# Patient Record
Sex: MALE | Race: WHITE | Employment: FULL TIME | URBAN - METROPOLITAN AREA
[De-identification: names, ages, dates, MRNs, and addresses within clinical notes are randomized per-mention and may not be internally consistent; named-entity substitution may affect disease eponyms.]

---

## 2022-09-21 ENCOUNTER — OFFICE VISIT (OUTPATIENT)
Dept: ORTHOPEDIC SURGERY | Age: 47
End: 2022-09-21
Payer: COMMERCIAL

## 2022-09-21 VITALS — HEIGHT: 71 IN | BODY MASS INDEX: 31.08 KG/M2 | WEIGHT: 222 LBS

## 2022-09-21 DIAGNOSIS — M17.0 OSTEOARTHRITIS OF PATELLOFEMORAL JOINTS OF BOTH KNEES: ICD-10-CM

## 2022-09-21 DIAGNOSIS — M25.562 PAIN IN BOTH KNEES, UNSPECIFIED CHRONICITY: Primary | ICD-10-CM

## 2022-09-21 DIAGNOSIS — M25.561 PAIN IN BOTH KNEES, UNSPECIFIED CHRONICITY: Primary | ICD-10-CM

## 2022-09-21 PROCEDURE — 99214 OFFICE O/P EST MOD 30 MIN: CPT | Performed by: ORTHOPAEDIC SURGERY

## 2022-09-21 NOTE — PROGRESS NOTES
Date:  2022    Name:  Amrit Chapman  Address:  [address]  [facility] [address]    :  1975      Age:   41 y.o.    SSN:  [id number]      Medical Record Number:  [id number]    Reason for Visit:    Chief Complaint    Knee Pain (Bilateral knees )      DOS:2022     HPI: Amrit Chapman is a 41 y.o. male here today for evaluation bilateral knee pain. Pain has been present for several years on the right and approximately 1 week on the left. Pain is worse with activity specifically climbing stairs, squatting, and rising from a seated position. He does admit and history of twisting injury approximately 1 year ago on the right but is unsure if this correlates with his pain. Denies numbness, tingling, decrease sensation or diminished motor function in either extremity. No locking, catching      Pain Assessment  Location of Pain: Knee  Location Modifiers: Right, Left  Severity of Pain: 2  Quality of Pain: Sharp  Duration of Pain: Persistent  Frequency of Pain: Intermittent  Aggravating Factors:  (twisting)  Limiting Behavior: Some  Relieving Factors: Rest  Result of Injury: No  Work-Related Injury: No  Are there other pain locations you wish to document?: No  ROS: All systems reviewed on patient intake form. Pertinent items are noted in HPI. History reviewed. No pertinent past medical history. History reviewed. No pertinent surgical history. History reviewed. No pertinent family history. Social History     Socioeconomic History    Marital status: Single     Spouse name: None    Number of children: None    Years of education: None    Highest education level: None   Tobacco Use    Smoking status: Never   Substance and Sexual Activity    Alcohol use: Yes     Comment: socially    Drug use: No       Current Outpatient Medications   Medication Sig Dispense Refill    ciprofloxacin (CIPRO) 500 MG tablet Take 500 mg by mouth 2 times daily.       cetirizine (ZYRTEC ALLERGY) 10 MG tablet Take 1 tablet by mouth daily. 5 tablet 0     No current facility-administered medications for this visit. No Known Allergies    Vital signs:  Ht 5' 11\" (1.803 m)   Wt 222 lb (100.7 kg)   BMI 30.96 kg/m²        Neuro: Alert & oriented x 3,  normal,  no focal deficits noted. Normal affect. Eyes: sclera clear  Ears: Normal external ear  Mouth:  No perioral lesions  Pulm: Respirations unlabored and regular  Pulse: Regular rate    Skin: Warm, well perfused        Right knee exam    Gait: No use of assistive devices. No antalgic gait. Alignment: normal alignment. Inspection/skin: Skin is intact without erythema or ecchymosis. No gross deformity. Palpation: Patellofemoral crepitance. no joint line tenderness present. Range of Motion: There is full range of motion. Mild hyperextension    Strength: Normal quadriceps development. Effusion: No effusion or swelling present. Ligamentous stability: No cruciate or collateral ligament instability. +1 patellar glide    Neurologic and vascular: Skin is warm and well-perfused. Sensation is intact to light-touch. Special tests: Negative Rodolfo sign. Positive patellar grind      Left knee exam    Gait: No use of assistive devices. No antalgic gait. Alignment: normal alignment. Inspection/skin: Skin is intact without erythema or ecchymosis. No gross deformity. Palpation: Patellofemoral crepitance. no joint line tenderness present. Range of Motion: There is full range of motion. Mild hyperextension    Strength: Normal quadriceps development. Effusion: No effusion or swelling present. Ligamentous stability: No cruciate or collateral ligament instability. +1 patellar glide    Neurologic and vascular: Skin is warm and well-perfused. Sensation is intact to light-touch. Special tests: Negative Rodolfo sign.   Positive patellar grind          Diagnostics:  Radiology:     Radiographs were obtained and reviewed in the office; 4 views: bilateral PA, bilateral Tyrell Proffer, bilateral Merchants AND bilateral lateral    Impression: Mild narrowing patellofemoral joint and medial right tibiofemoral joint. No fractures or dislocations. Assessment: 30-year-old male bilateral patellofemoral osteoarthritis    Plan: We discussed the patient's diagnosis of bilateral patellofemoral osteoarthritis. He also demonstrated mild hyperlaxity on examination globally. He is a candidate for formal physical therapy and over-the-counter anti-inflammatories as needed. We will see him back in 2 to 4 weeks for reevaluation and monitoring of progress. Ana Sudeepshantal is in agreement with this plan. All questions were answered to patient's satisfaction and was encouraged to call with any further questions. Orders Placed This Encounter   Procedures    XR KNEE RIGHT (MIN 4 VIEWS)     Standing Status:   Future     Number of Occurrences:   1     Standing Expiration Date:   9/21/2023     Order Specific Question:   Reason for exam:     Answer:   pain    XR KNEE LEFT (MIN 4 VIEWS)     Standing Status:   Future     Number of Occurrences:   1     Standing Expiration Date:   9/21/2023     Order Specific Question:   Reason for exam:     Answer:   pain       Regulo Frias D.O. Orthopedic Surgeon, [facility] Fellow    Total time spent for evaluation, education, and development of treatment plan: 45 minutes    I attest that I met personally with the patient, performed the described exam, reviewed the radiographic studies and medical records associated with this patient and supervised the services that are described above.      Argelia Payne MD

## 2022-10-03 ENCOUNTER — HOSPITAL ENCOUNTER (OUTPATIENT)
Dept: PHYSICAL THERAPY | Age: 47
Setting detail: THERAPIES SERIES
Discharge: HOME | End: 2022-10-03
Payer: COMMERCIAL

## 2022-10-03 PROCEDURE — 97110 THERAPEUTIC EXERCISES: CPT

## 2022-10-03 PROCEDURE — 97161 PT EVAL LOW COMPLEX 20 MIN: CPT

## 2022-10-03 NOTE — PLAN OF CARE
The [address] [place] [address] and Babetta Prader Certification    Dear  Dr. Sheela Figueroa,    We had the pleasure of evaluating the following patient for physical therapy services at [address] [place] [address]. A summary of our findings can be found in the initial assessment below. This includes our plan of care. If you have any questions or concerns regarding these findings, please do not hesitate to contact me at the office phone number checked above. Thank you for the referral.       Physician Signature:_______________________________Date:__________________  By signing above (or electronic signature), therapists plan is approved by physician      Patient: Jace Schmitz   : 1975   MRN: [id number]  Referring Physician:        Evaluation Date: 10/3/2022      Medical Diagnosis Information:  Diagnosis: M25.561 R knee pain, M25.562 L knee pain   Treatment Diagnosis: M25.561 R knee pain, M25.562 L knee pain                                         Insurance information:  [employer]     Precautions/ Contra-indications: none      C-SSRS Triggered by Intake questionnaire (Past 2 wk assessment):   [x] No, Questionnaire did not trigger screening.   [] Yes, Patient intake triggered further evaluation      [] C-SSRS Screening completed  [] PCP notified via Plan of Care  [] Emergency services notified     Latex Allergy:  [x]NO      []YES  Preferred Language for Healthcare:   [x]English       []other:    SUBJECTIVE: Patient stated complaint: Pt has remote hx of R tibial plateau fx. Pt recently had a twist and pop experience in R knee approx 1 year ago. He notes since then he has felt \"looser\" in his joint and pain has worsened a bit over time, notably with ? activity. Pt also had a twisting/popping incident on LLE, mid Sept. Pt was fairly worried about state of knees at that time and made appt with Dr. Sheela Figueroa.  Pt saw Dr. Dena Ordonez who recommended PT. Pt dx with PF OA. Relevant Medical History:(-) latex allergy, (-) adhesive sensitivity, (-) pacemaker, (-) metal/electrical implants, (-) heart history, (-) CA, (-) stroke, (-) seizure, (-) diabetes, (-) asthma or SOB    Functional Disability Index: FOTO: 59/100    Pain Scale: 2-5/10  Easing factors: tylenol, aleve prn  Provocative factors: steps, prolonged positioning, kneeling, walking, standing     Type: []Constant   []Intermittent  []Radiating []Localized []other:     Numbness/Tingling: R toes (possibly related to lumbar spine)    Occupation/School:  (50% on feet, 50% sitting; lifting heavy a couple times per day)    Living Status/Prior Level of Function: Independent with ADLs and IADLs    OBJECTIVE:     ROM LEFT RIGHT   HIP Flex WNL WNL   HIP Abd     HIP Ext     HIP IR WNL WNL   HIP ER WNL WNL   Knee ext 0 °  0 °    Knee Flex 140 °  140  °    Ankle PF     Ankle DF     Ankle In     Ankle Ev     Strength  LEFT RIGHT   HIP Flexors 5/5 5/5   HIP Abductors     HIP Ext     Hip ER     Knee EXT (quad) 4+/5 4+/5   Knee Flex (HS) 5/5 5/5   Ankle DF 5/5 5/5   Ankle PF     Ankle Inv     Ankle EV          Circumference  Mid patella     No swelling   No swelling     Reflexes/Sensation:    [x]Dermatomes/Myotomes intact    [x]Reflexes equal and normal bilaterally   []Other:    Joint mobility:    []Normal    []Hypo   [x]Hyper: bilat patella    Palpation: TTP: superior to patella R    Functional Mobility/Transfers: not limited    Posture: slight ? toeing out on RLE    Bandages/Dressings/Incisions: n/a    Gait: (include devices/WB status) antalgic gait RLE    Orthopedic Special Tests: NT                       [x] Patient history, allergies, meds reviewed. Medical chart reviewed. See intake form. Review Of Systems (ROS):  [x]Performed Review of systems (Integumentary, CardioPulmonary, Neurological) by intake and observation. Intake form has been scanned into medical record. Patient has been instructed to contact their primary care physician regarding ROS issues if not already being addressed at this time. Co-morbidities/Complexities (which will affect course of rehabilitation):   []None           Arthritic conditions   []Rheumatoid arthritis (M05.9)  [x]Osteoarthritis (M19.91)   Cardiovascular conditions   []Hypertension (I10)  []Hyperlipidemia (E78.5)  []Angina pectoris (I20)  []Atherosclerosis (I70)   Musculoskeletal conditions   []Disc pathology   []Congenital spine pathologies   []Prior surgical intervention  []Osteoporosis (M81.8)  []Osteopenia (M85.8)   Endocrine conditions   []Hypothyroid (E03.9)  []Hyperthyroid Gastrointestinal conditions   []Constipation (U86.94)   Metabolic conditions   []Morbid obesity (E66.01)  []Diabetes type 1(E10.65) or 2 (E11.65)   []Neuropathy (G60.9)     Pulmonary conditions   []Asthma (J45)  []Coughing   []COPD (J44.9)   Psychological Disorders  []Anxiety (F41.9)  []Depression (F32.9)   []Other:   []Other:          Barriers to/and or personal factors that will affect rehab potential:              []Age  []Sex              []Motivation/Lack of Motivation                        []Co-Morbidities              []Cognitive Function, education/learning barriers              []Environmental, home barriers              []profession/work barriers  []past PT/medical experience  []other:  Justification: none    Falls Risk Assessment (30 days):   [x] Falls Risk assessed and no intervention required.   [] Falls Risk assessed and Patient requires intervention due to being higher risk   TUG score (>12s at risk):     [] Falls education provided, including     ASSESSMENT:   Functional Impairments:     []Noted lumbar/proximal hip/LE joint hypomobility   [x]Decreased LE functional ROM   [x]Decreased core/proximal hip strength and neuromuscular control   [x]Decreased LE functional strength   [x]Reduced balance/proprioceptive control   []other:      Functional Activity Limitations (from functional questionnaire and intake)   []Reduced ability to tolerate prolonged functional positions   [x]Reduced ability or difficulty with changes of positions or transfers between positions   []Reduced ability to maintain good posture and demonstrate good body mechanics with sitting, bending, and lifting   []Reduced ability to sleep   [] Reduced ability or tolerance with driving and/or computer work   [x]Reduced ability to perform lifting, carrying tasks   [x]Reduced ability to squat   []Reduced ability to forward bend   [x]Reduced ability to ambulate prolonged functional periods/distances/surfaces   [x]Reduced ability to ascend/descend stairs   [x]Reduced ability to run, hop, cut or jump   []other:    Participation Restrictions   []Reduced participation in self care activities   [x]Reduced participation in home management activities   [x]Reduced participation in work activities   [x]Reduced participation in social activities. []Reduced participation in sport/recreation activities. Classification :    []Signs/symptoms consistent with post-surgical status including decreased ROM, strength and function.    []Signs/symptoms consistent with joint sprain/strain   [x]Signs/symptoms consistent with patella-femoral syndrome   [x]Signs/symptoms consistent with knee OA/hip OA   []Signs/symptoms consistent with internal derangement of knee/Hip   []Signs/symptoms consistent with functional hip weakness/NMR control      []Signs/symptoms consistent with tendinitis/tendinosis    []signs/symptoms consistent with pathology which may benefit from Dry needling      []other:      Prognosis/Rehab Potential:      []Excellent   [x]Good    []Fair   []Poor    Tolerance of evaluation/treatment:    []Excellent   [x]Good    []Fair   []Poor    Physical Therapy Evaluation Complexity Justification  [x] A history of present problem with:  [x] no personal factors and/or comorbidities that impact the plan of care;  []1-2 personal factors and/or comorbidities that impact the plan of care  []3 personal factors and/or comorbidities that impact the plan of care  [x] An examination of body systems using standardized tests and measures addressing any of the following: body structures and functions (impairments), activity limitations, and/or participation restrictions;:  [] a total of 1-2 or more elements   [x] a total of 3 or more elements   [] a total of 4 or more elements   [x] A clinical presentation with:  [x] stable and/or uncomplicated characteristics   [] evolving clinical presentation with changing characteristics  [] unstable and unpredictable characteristics;   [x] Clinical decision making of [x] low, [] moderate, [] high complexity using standardized patient assessment instrument and/or measurable assessment of functional outcome. [x] EVAL (LOW) 34821 (typically 20 minutes face-to-face)  [] EVAL (MOD) 80323 (typically 30 minutes face-to-face)  [] EVAL (HIGH) 76647 (typically 45 minutes face-to-face)  [] RE-EVAL     PLAN:   Frequency/Duration:  1-2 days per week for 6-8 Weeks:  Interventions:  [x]  Therapeutic exercise including: strength training, ROM, for Lower extremity and core   [x]  NMR activation and proprioception for LE, Glutes and Core   [x]  Manual therapy as indicated for LE, Hip and spine to include: Dry Needling/IASTM, STM, PROM, Gr I-IV mobilizations, manipulation. [x] Modalities as needed that may include: thermal agents, E-stim, Biofeedback, US, iontophoresis as indicated  [x] Patient education on joint protection, postural re-education, activity modification, progression of HEP. HEP instruction: Refer to [address] [place] [address] access code and exercises on the 1st visit treatment note    GOALS:  Patient stated goal: ? pain    Therapist goals for Patient:   Short Term Goals: To be achieved in: 2 weeks  1. Independent in HEP and progression per patient tolerance, in order to prevent re-injury.    [] Progressing: [] Met: [] Not Met: [] Adjusted     2. Patient will have a decrease in pain to facilitate improvement in movement, function, and ADLs as indicated by Functional Deficits. [] Progressing: [] Met: [] Not Met: [] Adjusted     Long Term Goals: To be achieved in: 6-8 weeks  1. FOTO >/=  69/100 to assist with reaching prior level of function. [] Progressing: [] Met: [] Not Met: [] Adjusted     2. Patient will demonstrate increased AROM to WNL to allow for proper joint functioning as indicated by patients Functional Deficits. [] Progressing: [] Met: [] Not Met: [] Adjusted     3. Patient will demonstrate an increase in Strength to good proximal hip strength and control, to 5/5 in LE to allow for proper functional mobility as indicated by patients Functional Deficits. [] Progressing: [] Met: [] Not Met: [] Adjusted     4. Patient will return to all functional activities without increased symptoms or restriction. [] Progressing: [] Met: [] Not Met: [] Adjusted     5.  Pt will (patient specific functional goal)    [] Progressing: [] Met: [] Not Met: [] Adjusted      Electronically signed by:  Pk Mejias, PT

## 2022-10-03 NOTE — FLOWSHEET NOTE
The [facility]      Physical Therapy Treatment Note/ Progress Report:     Date:  10/3/2022    Patient Name:  Jo Ann Boyd    :  1975  MRN: [id number]  Restrictions/Precautions:    Medical/Treatment Diagnosis Information:  Diagnosis: M25.561 R knee pain, M25.562 L knee pain  Treatment Diagnosis: M25.561 R knee pain, M25.562 L knee pain  Insurance/Certification information:   [facility]  Physician Information:   Dr. López Mon  Has the plan of care been signed (Y/N):        []  Yes  [x]  No     Date of Patient follow up with Physician: not scheduled yet, recommended in 1 month    Is this a Progress Report:     []  Yes  [x]  No      If Yes:  Date Range for reporting period:  Beginning:  ------------ Ending:     Progress report will be due (10 Rx or 30 days whichever is less): 03       Recertification will be due (POC Duration  / 90 days whichever is less): 12/3/22        Visit # Insurance Allowable Auth Required   In Person 1 Check NV []  Yes     []  No    Tele Health   []  Yes     []  No    Total 1       Functional Scale: FOTO: 59/100    Date assessed:  10/3      Latex Allergy:  [x]NO      []YES  Preferred Language for Healthcare:   [x]English       []other:    Pain level:  see eval/10     SUBJECTIVE:  See eval    OBJECTIVE: See eval  Observation:   Test measurements:      RESTRICTIONS/PRECAUTIONS: none    Exercises/Interventions:   Therapeutic Ex (31123) Sets/sec Reps Notes/CUES HEP   SLR 2 10 bilat x   SL hip ABD 2 10 bilat x   SL clamshell 2 10 bilat x   HS S seated in chair  Gastroc S 30\"  30\" 2  2 Bilat   Bilat, lunge X  x                                                    Pt edu: HEP, dx, POC, ice       Manual Intervention ([id number])                                                 NMR re-education (91295)   CUES NEEDED                                                                   Therapeutic Activity (07855) [employer] access code:   [email or web address]           Therapeutic Exercise and NMR EXR  [x] (57076) Provided verbal/tactile cueing for activities related to strengthening, flexibility, endurance, ROM for improvements in LE, proximal hip, and core control with self care, mobility, lifting, ambulation. [x] (77843) Provided verbal/tactile cueing for activities related to improving balance, coordination, kinesthetic sense, posture, motor skill, proprioception to assist with LE, proximal hip, and core control in self-care, mobility, lifting, ambulation and eccentric single leg control.      NMR and Therapeutic Activities:    [x] (07406 or 20649) Provided verbal/tactile cueing for activities related to improving balance, coordination, kinesthetic sense, posture, motor skill, proprioception and motor activation to allow for proper function of core, proximal hip and LE with self-care and ADLs and functional mobility.   [] (42098) Gait Re-education- Provided training and instruction to the patient for proper LE, core and proximal hip recruitment and positioning and eccentric body weight control with ambulation re-education including up and down stairs     Home Exercise Program:    [x] (15347) Reviewed/Progressed HEP activities related to strengthening, flexibility, endurance, ROM of core, proximal hip and LE for functional self-care, mobility, lifting and ambulation/stair navigation   [] (27069) Reviewed/Progressed HEP activities related to improving balance, coordination, kinesthetic sense, posture, motor skill, proprioception of core, proximal hip and LE for self-care, mobility, lifting, and ambulation/stair navigation      Manual Treatments:  PROM / STM / Oscillations-Mobs:  G-I, II, III, IV (PA's, Inf., Post.)  [] (63945) Provided manual therapy to mobilize LE, proximal hip and/or LS spine soft tissue/joints for the purpose of modulating pain, promoting relaxation, increasing ROM, reducing/eliminating soft tissue swelling/inflammation/restriction, improving soft tissue extensibility and allowing for proper ROM for normal function with self-care, mobility, lifting and ambulation. Modalities:  ice NV   [] GAME READY (VASO)- for significant edema, swelling, pain control. Charges:  Timed Code Treatment Minutes: 25   Total Treatment Minutes:  45      [x] EVAL (LOW) 69840 (typically 20 minutes face-to-face)  [] EVAL (MOD) 26212 (typically 30 minutes face-to-face)  [] EVAL (HIGH) 25372 (typically 45 minutes face-to-face)  [] RE-EVAL     [x] CS(92009) x   2  [] IONTO  [] NMR (75832) x     [] VASO  [] Manual (99140) x     [] Other:  [] TA x      [] Mech Traction (54810)  [] ES(attended) (31913)      [] ES (un) (34449):    ASSESSMENT:  See eval    GOALS:     Patient stated goal: ? pain     Therapist goals for Patient:   Short Term Goals: To be achieved in: 2 weeks  1. Independent in HEP and progression per patient tolerance, in order to prevent re-injury. [] Progressing: [] Met: [] Not Met: [] Adjusted      2. Patient will have a decrease in pain to facilitate improvement in movement, function, and ADLs as indicated by Functional Deficits. [] Progressing: [] Met: [] Not Met: [] Adjusted      Long Term Goals: To be achieved in: 6-8 weeks  1. FOTO >/=  69/100 to assist with reaching prior level of function. [] Progressing: [] Met: [] Not Met: [] Adjusted      2. Patient will demonstrate increased AROM to WNL to allow for proper joint functioning as indicated by patients Functional Deficits. [] Progressing: [] Met: [] Not Met: [] Adjusted      3. Patient will demonstrate an increase in Strength to good proximal hip strength and control, to 5/5 in LE to allow for proper functional mobility as indicated by patients Functional Deficits. [] Progressing: [] Met: [] Not Met: [] Adjusted      4. Patient will return to all functional activities without increased symptoms or restriction.    [] Progressing: [] Met: [] Not Met: [] Adjusted      5. Pt will (patient specific functional goal)    [] Progressing: [] Met: [] Not Met: [] Adjusted          Access Code: Sarah To  URL: [email or web address]  Date: 10/05/2022  Prepared by: Aneta Handy    Exercises  Supine Active Straight Leg Raise - 1 x daily - 7 x weekly - 3 sets - 10 reps  Sidelying Hip Abduction - 1 x daily - 7 x weekly - 3 sets - 10 reps  Clamshell - 1 x daily - 7 x weekly - 3 sets - 10 reps  Seated Hamstring Stretch - 1 x daily - 7 x weekly - 3 sets - 10 reps  Long Sitting Calf Stretch with Strap - 1 x daily - 7 x weekly - 3 sets - 10 reps  Gastroc Stretch on Wall - 1 x daily - 7 x weekly - 3 sets - 10 reps      Overall Progression Towards Functional goals/ Treatment Progress Update:  [] Patient is progressing as expected towards functional goals listed. [] Progression is slowed due to complexities/Impairments listed. [] Progression has been slowed due to co-morbidities.   [x] Plan just implemented, too soon to assess goals progression <30days   [] Goals require adjustment due to lack of progress  [] Patient is not progressing as expected and requires additional follow up with physician  [] Other    Prognosis for POC: [x] Good [] Fair  [] Poor      Patient requires continued skilled intervention: [x] Yes  [] No    Treatment/Activity Tolerance:  [x] Patient able to complete treatment  [] Patient limited by fatigue  [] Patient limited by pain    [] Patient limited by other medical complications  [] Other:     Return to Play: (if applicable)   []  Stage 1: Intro to Strength   []  Stage 2: Return to Run and Strength   []  Stage 3: Return to Jump and Strength   []  Stage 4: Dynamic Strength and Agility   []  Stage 5: Sport Specific Training     []  Ready to Return to Play, Meets All Above Stages   []  Not Ready for Return to Sports   Comments:                          PLAN: See eval  [] Continue per plan of care [] Alter current plan (see comments above)  [x] Plan of care initiated [] Hold pending MD visit [] Discharge    Electronically signed by:  Kobe Elise PT    Note: If patient does not return for scheduled/ recommended follow up visits, this note will serve as a discharge from care along with most recent update on progress.

## 2022-10-10 ENCOUNTER — HOSPITAL ENCOUNTER (OUTPATIENT)
Dept: PHYSICAL THERAPY | Age: 47
Discharge: HOME | End: 2022-10-10
Payer: COMMERCIAL

## 2022-10-10 PROCEDURE — 97140 MANUAL THERAPY 1/> REGIONS: CPT

## 2022-10-10 PROCEDURE — 97110 THERAPEUTIC EXERCISES: CPT

## 2022-10-10 NOTE — FLOWSHEET NOTE
[facility]      Physical Therapy Treatment Note/ Progress Report:     Date:  10/10/2022    Patient Name:  Ervin Perez    :  1975  MRN: [id number]  Restrictions/Precautions:    Medical/Treatment Diagnosis Information:  Diagnosis: M25.561 R knee pain, M25.562 L knee pain  Treatment Diagnosis: M25.561 R knee pain, M25.562 L knee pain  Insurance/Certification information:   [facility]  Physician Information:   Dr. Josee Godinez  Has the plan of care been signed (Y/N):        []  Yes  [x]  No     Date of Patient follow up with Physician: not scheduled yet, recommended in 1 month    Is this a Progress Report:     []  Yes  [x]  No      If Yes:  Date Range for reporting period:  Beginning:  ------------ Ending:     Progress report will be due (10 Rx or 30 days whichever is less): 07/3/30       Recertification will be due (POC Duration  / 90 days whichever is less): 12/3/22        Visit # Insurance Allowable Auth Required   In Person 2 Check NV []  Yes     []  No    Tele Health   []  Yes     []  No    Total 2       Functional Scale: FOTO: 59/100    Date assessed:  10/3      Latex Allergy:  []NO      [x]YES  Preferred Language for Healthcare:   [x]English       []other:    Pain level:  2/10     SUBJECTIVE:  Pt overall feels less pain and knee  already feels more stable. Pt however notes his low back is bothering him a little more. OBJECTIVE: See eval  Observation:   Test measurements:      RESTRICTIONS/PRECAUTIONS: none    Exercises/Interventions:   Therapeutic Ex (36547) Sets/sec Reps Notes/CUES HEP   SLR + PPT 2 10 Bilat, 10/10 added pelvic tilt due to ?  LBP x   SL hip ABD 2 10 bilat x   SL clamshell 3 10 bilat x   HS S seated in chair    Gastroc S 30\"  30\" 2  2 Bilat, will possibly switch to supine  Bilat, lunge X  x   bridge 2 10 Added 10/10 x   TKE 5\" 10 Bilat, blueTB, added 10/10 x                                      Pt edu: HEP       Manual Intervention (51810)       Quad rolling x8'  bilat                                       NMR re-education (34099)   CUES NEEDED                                                                   Therapeutic Activity (19249)                                          Un[employer] access code:   [email or web address]           Therapeutic Exercise and NMR EXR  [x] (97919) Provided verbal/tactile cueing for activities related to strengthening, flexibility, endurance, ROM for improvements in LE, proximal hip, and core control with self care, mobility, lifting, ambulation. [x] (74472) Provided verbal/tactile cueing for activities related to improving balance, coordination, kinesthetic sense, posture, motor skill, proprioception to assist with LE, proximal hip, and core control in self-care, mobility, lifting, ambulation and eccentric single leg control.      NMR and Therapeutic Activities:    [x] (73894 or 22210) Provided verbal/tactile cueing for activities related to improving balance, coordination, kinesthetic sense, posture, motor skill, proprioception and motor activation to allow for proper function of core, proximal hip and LE with self-care and ADLs and functional mobility.   [] (74620) Gait Re-education- Provided training and instruction to the patient for proper LE, core and proximal hip recruitment and positioning and eccentric body weight control with ambulation re-education including up and down stairs     Home Exercise Program:    [x] (11063) Reviewed/Progressed HEP activities related to strengthening, flexibility, endurance, ROM of core, proximal hip and LE for functional self-care, mobility, lifting and ambulation/stair navigation   [] (42635) Reviewed/Progressed HEP activities related to improving balance, coordination, kinesthetic sense, posture, motor skill, proprioception of core, proximal hip and LE for self-care, mobility, lifting, and ambulation/stair navigation      Manual Treatments:  PROM / STM / Oscillations-Mobs:  G-I, II, III, IV (PA's, Inf., Post.)  [x] (69373) Provided manual therapy to mobilize LE, proximal hip and/or LS spine soft tissue/joints for the purpose of modulating pain, promoting relaxation, increasing ROM, reducing/eliminating soft tissue swelling/inflammation/restriction, improving soft tissue extensibility and allowing for proper ROM for normal function with self-care, mobility, lifting and ambulation. Modalities:  declined ice   [] GAME READY (VASO)- for significant edema, swelling, pain control. Charges:  Timed Code Treatment Minutes: 40   Total Treatment Minutes:  40      [x] EVAL (LOW) 88966 (typically 20 minutes face-to-face)  [] EVAL (MOD) 14904 (typically 30 minutes face-to-face)  [] EVAL (HIGH) 38844 (typically 45 minutes face-to-face)  [] RE-EVAL     [x] ML(18344) x   2  [] IONTO  [] NMR (44945) x     [] VASO  [] Manual (68690) x     [] Other:  [] TA x      [] Mech Traction (65819)  [] ES(attended) (42874)      [] ES (un) (97757):    ASSESSMENT:  Pt tolerated treatment well and will benefit from ? strength and stability throughout bilat LE while addressing low level LPB. GOALS:     Patient stated goal: ? pain     Therapist goals for Patient:   Short Term Goals: To be achieved in: 2 weeks  1. Independent in HEP and progression per patient tolerance, in order to prevent re-injury. [] Progressing: [] Met: [] Not Met: [] Adjusted      2. Patient will have a decrease in pain to facilitate improvement in movement, function, and ADLs as indicated by Functional Deficits. [] Progressing: [] Met: [] Not Met: [] Adjusted      Long Term Goals: To be achieved in: 6-8 weeks  1. FOTO >/=  69/100 to assist with reaching prior level of function. [] Progressing: [] Met: [] Not Met: [] Adjusted      2. Patient will demonstrate increased AROM to WNL to allow for proper joint functioning as indicated by patients Functional Deficits.    [] Progressing: [] Met: [] Not Met: [] Adjusted 3. Patient will demonstrate an increase in Strength to good proximal hip strength and control, to 5/5 in LE to allow for proper functional mobility as indicated by patients Functional Deficits. [] Progressing: [] Met: [] Not Met: [] Adjusted      4. Patient will return to all functional activities without increased symptoms or restriction. [] Progressing: [] Met: [] Not Met: [] Adjusted      5. Pt will (patient specific functional goal)    [] Progressing: [] Met: [] Not Met: [] Adjusted          Access Code: Ana Paula Johnson  URL: [email or web address]  Date: 10/05/2022  Prepared by: Shanika Martinez    Exercises  Supine Active Straight Leg Raise - 1 x daily - 7 x weekly - 3 sets - 10 reps  Sidelying Hip Abduction - 1 x daily - 7 x weekly - 3 sets - 10 reps  Clamshell - 1 x daily - 7 x weekly - 3 sets - 10 reps  Seated Hamstring Stretch - 1 x daily - 7 x weekly - 3 sets - 10 reps  Long Sitting Calf Stretch with Strap - 1 x daily - 7 x weekly - 3 sets - 10 reps  Gastroc Stretch on Wall - 1 x daily - 7 x weekly - 3 sets - 10 reps      Overall Progression Towards Functional goals/ Treatment Progress Update:  [] Patient is progressing as expected towards functional goals listed. [] Progression is slowed due to complexities/Impairments listed. [] Progression has been slowed due to co-morbidities.   [x] Plan just implemented, too soon to assess goals progression <30days   [] Goals require adjustment due to lack of progress  [] Patient is not progressing as expected and requires additional follow up with physician  [] Other    Prognosis for POC: [x] Good [] Fair  [] Poor      Patient requires continued skilled intervention: [x] Yes  [] No    Treatment/Activity Tolerance:  [x] Patient able to complete treatment  [] Patient limited by fatigue  [] Patient limited by pain    [] Patient limited by other medical complications  [] Other:     Return to Play: (if applicable)   []  Stage 1: Intro to Strength   []  Stage 2: Return to Run and Strength   []  Stage 3: Return to Jump and Strength   []  Stage 4: Dynamic Strength and Agility   []  Stage 5: Sport Specific Training     []  Ready to Return to Play, Meets All Above Stages   []  Not Ready for Return to Sports   Comments:                          PLAN: See eval  [x] Continue per plan of care [] Alter current plan (see comments above)  [] Plan of care initiated [] Hold pending MD visit [] Discharge    Electronically signed by:  Lena Burk PT    Note: If patient does not return for scheduled/ recommended follow up visits, this note will serve as a discharge from care along with most recent update on progress.

## 2022-10-17 ENCOUNTER — HOSPITAL ENCOUNTER (OUTPATIENT)
Dept: PHYSICAL THERAPY | Age: 47
Setting detail: THERAPIES SERIES
Discharge: HOME | End: 2022-10-17

## 2022-10-17 PROCEDURE — 97140 MANUAL THERAPY 1/> REGIONS: CPT

## 2022-10-17 PROCEDURE — 97112 NEUROMUSCULAR REEDUCATION: CPT

## 2022-10-17 PROCEDURE — 97110 THERAPEUTIC EXERCISES: CPT

## 2022-10-17 NOTE — FLOWSHEET NOTE
[facility]      Physical Therapy Treatment Note/ Progress Report:     Date:  10/17/2022    Patient Name:  Anthony Almaguer    :  1975  MRN: [id number]  Restrictions/Precautions:    Medical/Treatment Diagnosis Information:  Diagnosis: M25.561 R knee pain, M25.562 L knee pain  Treatment Diagnosis: M25.561 R knee pain, M25.562 L knee pain  Insurance/Certification information:   [facility]  Physician Information:   Dr. Lopez Sos  Has the plan of care been signed (Y/N):        []  Yes  [x]  No     Date of Patient follow up with Physician: not scheduled yet, recommended in 1 month    Is this a Progress Report:     []  Yes  [x]  No      If Yes:  Date Range for reporting period:  Beginning:  ------------ Ending:     Progress report will be due (10 Rx or 30 days whichever is less): 72/3/63       Recertification will be due (POC Duration  / 90 days whichever is less): 12/3/22        Visit # Insurance Allowable Auth Required   In Person 3  []  Yes     []  No    Tele Health   []  Yes     []  No    Total 3       Functional Scale: FOTO: 59/100    Date assessed:  10/3      Latex Allergy:  []NO      [x]YES  Preferred Language for Healthcare:   [x]English       []other:    Pain level:  2/10     SUBJECTIVE:  Pt feels improvement overall and does feel more stable. OBJECTIVE: See eval  Observation:   Test measurements:      RESTRICTIONS/PRECAUTIONS: none    Exercises/Interventions:   Therapeutic Ex (60865) Sets/sec Reps Notes/CUES HEP   SLR + PPT 3 10 Bilat, 10/10 added pelvic tilt due to ?  LBP x   SL hip ABD 2 10 bilat x   SL clamshell 3 10 bilat x   HS S seated in chair  Gastroc S 30\"  30\" 2  2 Bilat  Bilat, lunge X  x   bridge 3 10 Added 10/10 x   TKE 5\" 10 Bilat, blueTB, added 10/10 x   SLS 30\" 3 Bilat on foam, added 10/17                                Pt edu: HEP       Manual Intervention (50112)       Quad rolling x8'  bilat NMR re-education (78311)   CUES NEEDED                                                                   Therapeutic Activity (63650)                                          [employer] access code:   [email or web address]           Therapeutic Exercise and NMR EXR  [x] (35405) Provided verbal/tactile cueing for activities related to strengthening, flexibility, endurance, ROM for improvements in LE, proximal hip, and core control with self care, mobility, lifting, ambulation. [x] (05430) Provided verbal/tactile cueing for activities related to improving balance, coordination, kinesthetic sense, posture, motor skill, proprioception to assist with LE, proximal hip, and core control in self-care, mobility, lifting, ambulation and eccentric single leg control.      NMR and Therapeutic Activities:    [x] (93796 or 82775) Provided verbal/tactile cueing for activities related to improving balance, coordination, kinesthetic sense, posture, motor skill, proprioception and motor activation to allow for proper function of core, proximal hip and LE with self-care and ADLs and functional mobility.   [] (44662) Gait Re-education- Provided training and instruction to the patient for proper LE, core and proximal hip recruitment and positioning and eccentric body weight control with ambulation re-education including up and down stairs     Home Exercise Program:    [x] (27708) Reviewed/Progressed HEP activities related to strengthening, flexibility, endurance, ROM of core, proximal hip and LE for functional self-care, mobility, lifting and ambulation/stair navigation   [] (73684) Reviewed/Progressed HEP activities related to improving balance, coordination, kinesthetic sense, posture, motor skill, proprioception of core, proximal hip and LE for self-care, mobility, lifting, and ambulation/stair navigation      Manual Treatments:  PROM / STM / Oscillations-Mobs:  G-I, II, III, IV (PA's, Inf., Post.)  [x] (22016) Provided manual therapy to mobilize LE, proximal hip and/or LS spine soft tissue/joints for the purpose of modulating pain, promoting relaxation, increasing ROM, reducing/eliminating soft tissue swelling/inflammation/restriction, improving soft tissue extensibility and allowing for proper ROM for normal function with self-care, mobility, lifting and ambulation. Modalities:  declined ice   [] GAME READY (VASO)- for significant edema, swelling, pain control. Charges:  Timed Code Treatment Minutes: 40   Total Treatment Minutes:  40      [] EVAL (LOW) 79316 (typically 20 minutes face-to-face)  [] EVAL (MOD) 55750 (typically 30 minutes face-to-face)  [] EVAL (HIGH) 53736 (typically 45 minutes face-to-face)  [] RE-EVAL     [x] DU(39813) x   2  [] IONTO  [] NMR (78579) x     [] VASO  [x] Manual (77724) x  1   [] Other:  [] TA x      [] Mech Traction (61345)  [] ES(attended) (76082)      [] ES (un) (56061):    ASSESSMENT:  Pt tolerated treatment well and will benefit from ? strength and stability throughout bilat LE while addressing low level LPB. GOALS:     Patient stated goal: ? pain     Therapist goals for Patient:   Short Term Goals: To be achieved in: 2 weeks  1. Independent in HEP and progression per patient tolerance, in order to prevent re-injury. [x] Progressing: [] Met: [] Not Met: [] Adjusted      2. Patient will have a decrease in pain to facilitate improvement in movement, function, and ADLs as indicated by Functional Deficits. [x] Progressing: [] Met: [] Not Met: [] Adjusted      Long Term Goals: To be achieved in: 6-8 weeks  1. FOTO >/=  69/100 to assist with reaching prior level of function. [x] Progressing: [] Met: [] Not Met: [] Adjusted      2. Patient will demonstrate increased AROM to WNL to allow for proper joint functioning as indicated by patients Functional Deficits. [x] Progressing: [] Met: [] Not Met: [] Adjusted      3.  Patient will demonstrate an increase in Strength to good proximal hip strength and control, to 5/5 in LE to allow for proper functional mobility as indicated by patients Functional Deficits. [x] Progressing: [] Met: [] Not Met: [] Adjusted      4. Patient will return to all functional activities without increased symptoms or restriction. [x] Progressing: [] Met: [] Not Met: [] Adjusted      5. Pt will (patient specific functional goal)    [x] Progressing: [] Met: [] Not Met: [] Adjusted          Access Code: [employer]  URL: [employer]/  Date: 10/05/2022  Prepared by: Emily Davalos    Exercises  Supine Active Straight Leg Raise - 1 x daily - 7 x weekly - 3 sets - 10 reps  Sidelying Hip Abduction - 1 x daily - 7 x weekly - 3 sets - 10 reps  Clamshell - 1 x daily - 7 x weekly - 3 sets - 10 reps  Seated Hamstring Stretch - 1 x daily - 7 x weekly - 3 sets - 10 reps  Long Sitting Calf Stretch with Strap - 1 x daily - 7 x weekly - 3 sets - 10 reps  Gastroc Stretch on Wall - 1 x daily - 7 x weekly - 3 sets - 10 reps      Overall Progression Towards Functional goals/ Treatment Progress Update:  [] Patient is progressing as expected towards functional goals listed. [] Progression is slowed due to complexities/Impairments listed. [] Progression has been slowed due to co-morbidities.   [x] Plan just implemented, too soon to assess goals progression <30days   [] Goals require adjustment due to lack of progress  [] Patient is not progressing as expected and requires additional follow up with physician  [] Other    Prognosis for POC: [x] Good [] Fair  [] Poor      Patient requires continued skilled intervention: [x] Yes  [] No    Treatment/Activity Tolerance:  [x] Patient able to complete treatment  [] Patient limited by fatigue  [] Patient limited by pain    [] Patient limited by other medical complications  [] Other:     Return to Play: (if applicable)   []  Stage 1: Intro to Strength   []  Stage 2: Return to Run and Strength   []  Stage 3: Return to Jump and Strength   []  Stage 4: Dynamic Strength and Agility   []  Stage 5: Sport Specific Training     []  Ready to Return to Play, Meets All Above Stages   []  Not Ready for Return to Sports   Comments:                          PLAN: See eval  [x] Continue per plan of care [] Alter current plan (see comments above)  [] Plan of care initiated [] Hold pending MD visit [] Discharge    Electronically signed by:  Elayne Teran PT    Note: If patient does not return for scheduled/ recommended follow up visits, this note will serve as a discharge from care along with most recent update on progress.

## 2022-10-24 ENCOUNTER — HOSPITAL ENCOUNTER (OUTPATIENT)
Dept: PHYSICAL THERAPY | Age: 47
Setting detail: THERAPIES SERIES
Discharge: HOME | End: 2022-10-24
Payer: COMMERCIAL

## 2022-10-24 PROCEDURE — 97110 THERAPEUTIC EXERCISES: CPT

## 2022-10-24 NOTE — FLOWSHEET NOTE
[facility]      Physical Therapy Treatment Note/ Progress Report:     Date:  10/24/2022    Patient Name:  Artem Amato    :  1975  MRN: [id number]  Restrictions/Precautions:    Medical/Treatment Diagnosis Information:  Diagnosis: M25.561 R knee pain, M25.562 L knee pain  Treatment Diagnosis: M25.561 R knee pain, M25.562 L knee pain  Insurance/Certification information:   [facility]  Physician Information:   Dr. Nguyễn Berman  Has the plan of care been signed (Y/N):        []  Yes  [x]  No     Date of Patient follow up with Physician: not scheduled yet, recommended in 1 month    Is this a Progress Report:     []  Yes  [x]  No      If Yes:  Date Range for reporting period:  Beginning:  ------------ Ending:     Progress report will be due (10 Rx or 30 days whichever is less): 66       Recertification will be due (POC Duration  / 90 days whichever is less): 12/3/22        Visit # Insurance Allowable Auth Required   In Person 4  []  Yes     []  No    Tele Health   []  Yes     []  No    Total 4       Functional Scale: FOTO: 59/100    Date assessed:  10/3      Latex Allergy:  []NO      [x]YES  Preferred Language for Healthcare:   [x]English       []other:    Pain level:  2/10     SUBJECTIVE:  Pt feels continued improvement overall. OBJECTIVE: See eval  Observation:   Test measurements:      RESTRICTIONS/PRECAUTIONS: none    Exercises/Interventions:   Therapeutic Ex (04742) Sets/sec Reps Notes/CUES HEP   SLR + PPT 3 10 Bilat, 10/10 added pelvic tilt due to ?  LBP x   SL hip ABD 2 10 bilat x   SL clamshell 3 10 bilat x   HS S seated in chair  Gastroc S 30\"  30\" 2  2 Bilat  Bilat, lunge X  x   bridge 3 10 Added 10/10 x   TKE 5\" 10 Bilat, blueTB, added 10/10 x   SLS 30\" 3 Bilat on foam, added 10    Wall sit 10\" 10                                Manual Intervention (68206)       Quad rolling x8'  bilat                                       NMR re-education (72516)   CUES NEEDED                                                                   Therapeutic Activity (04362)                                          Acceptd access code:   [email or web address]           Therapeutic Exercise and NMR EXR  [x] (76170) Provided verbal/tactile cueing for activities related to strengthening, flexibility, endurance, ROM for improvements in LE, proximal hip, and core control with self care, mobility, lifting, ambulation. [x] (08889) Provided verbal/tactile cueing for activities related to improving balance, coordination, kinesthetic sense, posture, motor skill, proprioception to assist with LE, proximal hip, and core control in self-care, mobility, lifting, ambulation and eccentric single leg control.      NMR and Therapeutic Activities:    [x] (94735 or 32919) Provided verbal/tactile cueing for activities related to improving balance, coordination, kinesthetic sense, posture, motor skill, proprioception and motor activation to allow for proper function of core, proximal hip and LE with self-care and ADLs and functional mobility.   [] (82475) Gait Re-education- Provided training and instruction to the patient for proper LE, core and proximal hip recruitment and positioning and eccentric body weight control with ambulation re-education including up and down stairs     Home Exercise Program:    [x] (56682) Reviewed/Progressed HEP activities related to strengthening, flexibility, endurance, ROM of core, proximal hip and LE for functional self-care, mobility, lifting and ambulation/stair navigation   [] (31169) Reviewed/Progressed HEP activities related to improving balance, coordination, kinesthetic sense, posture, motor skill, proprioception of core, proximal hip and LE for self-care, mobility, lifting, and ambulation/stair navigation      Manual Treatments:  PROM / STM / Oscillations-Mobs:  G-I, II, III, IV (PA's, Inf., Post.)  [x] (32146) Provided manual therapy to mobilize LE, proximal hip and/or LS spine soft tissue/joints for the purpose of modulating pain, promoting relaxation, increasing ROM, reducing/eliminating soft tissue swelling/inflammation/restriction, improving soft tissue extensibility and allowing for proper ROM for normal function with self-care, mobility, lifting and ambulation. Modalities:  declined ice   [] GAME READY (VASO)- for significant edema, swelling, pain control. Charges:  Timed Code Treatment Minutes: 40   Total Treatment Minutes:  40      [] EVAL (LOW) 61266 (typically 20 minutes face-to-face)  [] EVAL (MOD) 02105 (typically 30 minutes face-to-face)  [] EVAL (HIGH) 14407 (typically 45 minutes face-to-face)  [] RE-EVAL     [x] COHN(15885) x   2  [] IONTO  [] NMR (38425) x     [] VASO  [x] Manual (55530) x  1   [] Other:  [] TA x      [] Mech Traction (90231)  [] ES(attended) (87167)      [] ES (un) (66572):    ASSESSMENT:  Pt tolerated treatment well and will benefit from ? strength and stability throughout bilat LE while addressing low level LPB. GOALS:     Patient stated goal: ? pain     Therapist goals for Patient:   Short Term Goals: To be achieved in: 2 weeks  1. Independent in HEP and progression per patient tolerance, in order to prevent re-injury. [x] Progressing: [] Met: [] Not Met: [] Adjusted      2. Patient will have a decrease in pain to facilitate improvement in movement, function, and ADLs as indicated by Functional Deficits. [x] Progressing: [] Met: [] Not Met: [] Adjusted      Long Term Goals: To be achieved in: 6-8 weeks  1. FOTO >/=  69/100 to assist with reaching prior level of function. [x] Progressing: [] Met: [] Not Met: [] Adjusted      2. Patient will demonstrate increased AROM to WNL to allow for proper joint functioning as indicated by patients Functional Deficits. [x] Progressing: [] Met: [] Not Met: [] Adjusted      3.  Patient will demonstrate an increase in Strength to good proximal hip strength and control, to 5/5 in LE to allow for proper functional mobility as indicated by patients Functional Deficits. [x] Progressing: [] Met: [] Not Met: [] Adjusted      4. Patient will return to all functional activities without increased symptoms or restriction. [x] Progressing: [] Met: [] Not Met: [] Adjusted      5. Pt will (patient specific functional goal)    [x] Progressing: [] Met: [] Not Met: [] Adjusted          Access Code: Jessica Caicedo  URL: [email or web address]/  Date: 10/05/2022  Prepared by: Melissa Leblanc    Exercises  Supine Active Straight Leg Raise - 1 x daily - 7 x weekly - 3 sets - 10 reps  Sidelying Hip Abduction - 1 x daily - 7 x weekly - 3 sets - 10 reps  Clamshell - 1 x daily - 7 x weekly - 3 sets - 10 reps  Seated Hamstring Stretch - 1 x daily - 7 x weekly - 3 sets - 10 reps  Long Sitting Calf Stretch with Strap - 1 x daily - 7 x weekly - 3 sets - 10 reps  Gastroc Stretch on Wall - 1 x daily - 7 x weekly - 3 sets - 10 reps      Overall Progression Towards Functional goals/ Treatment Progress Update:  [] Patient is progressing as expected towards functional goals listed. [] Progression is slowed due to complexities/Impairments listed. [] Progression has been slowed due to co-morbidities.   [x] Plan just implemented, too soon to assess goals progression <30days   [] Goals require adjustment due to lack of progress  [] Patient is not progressing as expected and requires additional follow up with physician  [] Other    Prognosis for POC: [x] Good [] Fair  [] Poor      Patient requires continued skilled intervention: [x] Yes  [] No    Treatment/Activity Tolerance:  [x] Patient able to complete treatment  [] Patient limited by fatigue  [] Patient limited by pain    [] Patient limited by other medical complications  [] Other:                          PLAN: See eval  [x] Continue per plan of care [] Alter current plan (see comments above)  [] Plan of care initiated [] Hold pending MD visit [] Discharge    Electronically signed by:  Elayne Teran PT    Note: If patient does not return for scheduled/ recommended follow up visits, this note will serve as a discharge from care along with most recent update on progress.

## 2022-10-31 ENCOUNTER — APPOINTMENT (OUTPATIENT)
Dept: PHYSICAL THERAPY | Age: 47
End: 2022-10-31
Payer: COMMERCIAL

## 2022-11-02 ENCOUNTER — HOSPITAL ENCOUNTER (OUTPATIENT)
Dept: PHYSICAL THERAPY | Age: 47
Setting detail: THERAPIES SERIES
Discharge: HOME | End: 2022-11-02
Payer: COMMERCIAL

## 2022-11-02 PROCEDURE — 97110 THERAPEUTIC EXERCISES: CPT

## 2022-11-02 NOTE — FLOWSHEET NOTE
[facility] [facility]      Physical Therapy Treatment Note/ Progress Report:     Date:  2022    Patient Name:  Drew Romano    :  1975  MRN: [id number]  Restrictions/Precautions:    Medical/Treatment Diagnosis Information:  Diagnosis: M25.561 R knee pain, M25.562 L knee pain  Treatment Diagnosis: M25.561 R knee pain, M25.562 L knee pain  Insurance/Certification information:   [facility]  Physician Information:   Dr. Donell Kumar  Has the plan of care been signed (Y/N):        []  Yes  [x]  No     Date of Patient follow up with Physician: not scheduled yet, recommended in 1 month    Is this a Progress Report:     []  Yes  [x]  No      If Yes:  Date Range for reporting period:  Beginning:  ------------ Ending:     Progress report will be due (10 Rx or 30 days whichever is less):        Recertification will be due (POC Duration  / 90 days whichever is less): 12/3/22        Visit # Insurance Allowable Auth Required   In Person 5  []  Yes     []  No    Tele Health   []  Yes     []  No    Total 5       Functional Scale: FOTO: 59/100    Date assessed:  10/3      Latex Allergy:  []NO      [x]YES  Preferred Language for Healthcare:   [x]English       []other:    Pain level:  10     SUBJECTIVE:  Pt reports his low back is hurting a bit more, still comes and goes. His knee feels a little sore. OBJECTIVE: See eval  Observation:   Test measurements:      RESTRICTIONS/PRECAUTIONS: none    Exercises/Interventions:   Therapeutic Ex (69701) Sets/sec Reps Notes/CUES HEP   SLR + PPT seated 3 10 Bilat,  changed to sitting x   SL hip ABD 2 10 bilat x   SL clamshell 3 10 Bilat, blueTB x   HS S seated in chair  Gastroc S 30\"  30\" 2  2 Bilat  Bilat, lunge X  x   D/C, ? LBP x   TKE 5\" 2x10 Bilat, CC level 4, added 10/10 x   SLS 30\" 3 Bilat on foam, added 10/17    D/C, ?  LBP                               Manual Intervention (09793)        bilat    STM R sided lumbar paraspinals x8'                                  NMR re-education (49922)   CUES NEEDED                                                                   Therapeutic Activity (41163)                                          [employer] access code:   [email or web address]           Therapeutic Exercise and NMR EXR  [x] (06189) Provided verbal/tactile cueing for activities related to strengthening, flexibility, endurance, ROM for improvements in LE, proximal hip, and core control with self care, mobility, lifting, ambulation. [x] (02106) Provided verbal/tactile cueing for activities related to improving balance, coordination, kinesthetic sense, posture, motor skill, proprioception to assist with LE, proximal hip, and core control in self-care, mobility, lifting, ambulation and eccentric single leg control.      NMR and Therapeutic Activities:    [x] (65872 or 99841) Provided verbal/tactile cueing for activities related to improving balance, coordination, kinesthetic sense, posture, motor skill, proprioception and motor activation to allow for proper function of core, proximal hip and LE with self-care and ADLs and functional mobility.   [] (01294) Gait Re-education- Provided training and instruction to the patient for proper LE, core and proximal hip recruitment and positioning and eccentric body weight control with ambulation re-education including up and down stairs     Home Exercise Program:    [x] (64596) Reviewed/Progressed HEP activities related to strengthening, flexibility, endurance, ROM of core, proximal hip and LE for functional self-care, mobility, lifting and ambulation/stair navigation   [] (12162) Reviewed/Progressed HEP activities related to improving balance, coordination, kinesthetic sense, posture, motor skill, proprioception of core, proximal hip and LE for self-care, mobility, lifting, and ambulation/stair navigation      Manual Treatments:  PROM / STM / Oscillations-Mobs:  G-I, II, III, IV (PA's, Inf., Post.)  [x] (18467) Provided manual therapy to mobilize LE, proximal hip and/or LS spine soft tissue/joints for the purpose of modulating pain, promoting relaxation, increasing ROM, reducing/eliminating soft tissue swelling/inflammation/restriction, improving soft tissue extensibility and allowing for proper ROM for normal function with self-care, mobility, lifting and ambulation. Modalities:  declined ice   [] GAME READY (VASO)- for significant edema, swelling, pain control. Charges:  Timed Code Treatment Minutes: 40   Total Treatment Minutes:  40      [] EVAL (LOW) 36116 (typically 20 minutes face-to-face)  [] EVAL (MOD) 77645 (typically 30 minutes face-to-face)  [] EVAL (HIGH) 77669 (typically 45 minutes face-to-face)  [] RE-EVAL     [x] KY(42400) x   2  [] IONTO  [] NMR (80424) x     [] VASO  [x] Manual (06370) x  1   [] Other:  [] TA x      [] Mech Traction (41895)  [] ES(attended) (16045)      [] ES (un) (18690):    ASSESSMENT:  Pt and PT altered ex some this visit to alleviate LBP. Pt would benefit from progressive core ex in addition to hip and knee strengthening. GOALS:     Patient stated goal: ? pain     Therapist goals for Patient:   Short Term Goals: To be achieved in: 2 weeks  1. Independent in HEP and progression per patient tolerance, in order to prevent re-injury. [x] Progressing: [] Met: [] Not Met: [] Adjusted      2. Patient will have a decrease in pain to facilitate improvement in movement, function, and ADLs as indicated by Functional Deficits. [x] Progressing: [] Met: [] Not Met: [] Adjusted      Long Term Goals: To be achieved in: 6-8 weeks  1. FOTO >/=  69/100 to assist with reaching prior level of function. [x] Progressing: [] Met: [] Not Met: [] Adjusted      2. Patient will demonstrate increased AROM to WNL to allow for proper joint functioning as indicated by patients Functional Deficits. [x] Progressing: [] Met: [] Not Met: [] Adjusted      3.  Patient will demonstrate an increase in Strength to good proximal hip strength and control, to 5/5 in LE to allow for proper functional mobility as indicated by patients Functional Deficits. [x] Progressing: [] Met: [] Not Met: [] Adjusted      4. Patient will return to all functional activities without increased symptoms or restriction. [x] Progressing: [] Met: [] Not Met: [] Adjusted      5. Pt will (patient specific functional goal)    [x] Progressing: [] Met: [] Not Met: [] Adjusted          Access Code: Cecilia Godinez  URL: [employer]/  Date: 10/05/2022  Prepared by: Glenna Cash  Supine Active Straight Leg Raise - 1 x daily - 7 x weekly - 3 sets - 10 reps  Sidelying Hip Abduction - 1 x daily - 7 x weekly - 3 sets - 10 reps  Clamshell - 1 x daily - 7 x weekly - 3 sets - 10 reps  Seated Hamstring Stretch - 1 x daily - 7 x weekly - 3 sets - 10 reps  Long Sitting Calf Stretch with Strap - 1 x daily - 7 x weekly - 3 sets - 10 reps  Gastroc Stretch on Wall - 1 x daily - 7 x weekly - 3 sets - 10 reps      Overall Progression Towards Functional goals/ Treatment Progress Update:  [] Patient is progressing as expected towards functional goals listed. [] Progression is slowed due to complexities/Impairments listed. [] Progression has been slowed due to co-morbidities.   [x] Plan just implemented, too soon to assess goals progression <30days   [] Goals require adjustment due to lack of progress  [] Patient is not progressing as expected and requires additional follow up with physician  [] Other    Prognosis for POC: [x] Good [] Fair  [] Poor      Patient requires continued skilled intervention: [x] Yes  [] No    Treatment/Activity Tolerance:  [x] Patient able to complete treatment  [] Patient limited by fatigue  [] Patient limited by pain    [] Patient limited by other medical complications  [] Other:                          PLAN: See eval  [x] Continue per plan of care [] Alter current plan (see comments above)  [] Plan of care initiated [] Hold pending MD visit [] Discharge    Electronically signed by:  Baldev Knight PT    Note: If patient does not return for scheduled/ recommended follow up visits, this note will serve as a discharge from care along with most recent update on progress. spouse/private house (mother/daughter house)

## 2022-11-09 ENCOUNTER — HOSPITAL ENCOUNTER (OUTPATIENT)
Dept: PHYSICAL THERAPY | Age: 47
Setting detail: THERAPIES SERIES
Discharge: HOME | End: 2022-11-09
Payer: COMMERCIAL

## 2022-11-09 PROCEDURE — 97110 THERAPEUTIC EXERCISES: CPT

## 2022-11-09 NOTE — FLOWSHEET NOTE
[facility]      Physical Therapy Treatment Note/ Progress Report:     Date:  2022    Patient Name:  Ashley Ventura    :  1975  MRN: [id number]  Restrictions/Precautions:    Medical/Treatment Diagnosis Information:  Diagnosis: M25.561 R knee pain, M25.562 L knee pain  Treatment Diagnosis: M25.561 R knee pain, M25.562 L knee pain  Insurance/Certification information:   [facility]  Physician Information:   Dr. Piper Santos  Has the plan of care been signed (Y/N):        []  Yes  [x]  No     Date of Patient follow up with Physician: not scheduled yet, recommended in 1 month    Is this a Progress Report:     []  Yes  [x]  No      If Yes:  Date Range for reporting period:  Beginning:  ------------ Ending:     Progress report will be due (10 Rx or 30 days whichever is less):        Recertification will be due (POC Duration  / 90 days whichever is less): 12/3/22        Visit # Insurance Allowable Auth Required   In Person 6 6 visits (10/24 - ) []  Yes     []  No    Tele Health   []  Yes     []  No    Total 6       Functional Scale: FOTO: 59/100    Date assessed:  10/3      Latex Allergy:  []NO      [x]YES  Preferred Language for Healthcare:   [x]English       []other:    Pain level:  1/10     SUBJECTIVE:  Pt reports his back continues to cause him some pain off and on. His knee feels \"ok. \"    OBJECTIVE: See eval  Observation:   Test measurements:      RESTRICTIONS/PRECAUTIONS: none    Exercises/Interventions:   Therapeutic Ex (33411) Sets/sec Reps Notes/CUES HEP   SLR + PPT seated 3 10 Bilat,  changed to sitting x   SL hip ABD 2 10 bilat x   SL clamshell 3 10 Bilat, blueTB x   HS S seated in chair  Gastroc S 30\"  30\" 2  2 Bilat  slant X  x   D/C, ? LBP x   TKE 5\" 2x10 Bilat, CC level 4, added 10/10 x   SLS 30\" 3 Bilat on foam, added 10/    D/C, ?  LBP    DKTC 10\" 5                         Manual Intervention (92191)        bilat    STM R sided lumbar paraspinals x8'                                  NMR re-education (10692)   CUES NEEDED                                                                   Therapeutic Activity (59623)                                          [employer] access code:   [email or web address]           Therapeutic Exercise and NMR EXR  [x] (60116) Provided verbal/tactile cueing for activities related to strengthening, flexibility, endurance, ROM for improvements in LE, proximal hip, and core control with self care, mobility, lifting, ambulation. [x] (26025) Provided verbal/tactile cueing for activities related to improving balance, coordination, kinesthetic sense, posture, motor skill, proprioception to assist with LE, proximal hip, and core control in self-care, mobility, lifting, ambulation and eccentric single leg control.      NMR and Therapeutic Activities:    [x] (95242 or 92559) Provided verbal/tactile cueing for activities related to improving balance, coordination, kinesthetic sense, posture, motor skill, proprioception and motor activation to allow for proper function of core, proximal hip and LE with self-care and ADLs and functional mobility.   [] (61565) Gait Re-education- Provided training and instruction to the patient for proper LE, core and proximal hip recruitment and positioning and eccentric body weight control with ambulation re-education including up and down stairs     Home Exercise Program:    [x] (47586) Reviewed/Progressed HEP activities related to strengthening, flexibility, endurance, ROM of core, proximal hip and LE for functional self-care, mobility, lifting and ambulation/stair navigation   [] (66589) Reviewed/Progressed HEP activities related to improving balance, coordination, kinesthetic sense, posture, motor skill, proprioception of core, proximal hip and LE for self-care, mobility, lifting, and ambulation/stair navigation      Manual Treatments:  PROM / STM / Oscillations-Mobs:  G-I, II, III, IV (PA's, Inf., Post.)  [x] (39965) Provided manual therapy to mobilize LE, proximal hip and/or LS spine soft tissue/joints for the purpose of modulating pain, promoting relaxation, increasing ROM, reducing/eliminating soft tissue swelling/inflammation/restriction, improving soft tissue extensibility and allowing for proper ROM for normal function with self-care, mobility, lifting and ambulation. Modalities:  declined ice   [] GAME READY (VASO)- for significant edema, swelling, pain control. Charges:  Timed Code Treatment Minutes: 40   Total Treatment Minutes:  40      [] EVAL (LOW) 84068 (typically 20 minutes face-to-face)  [] EVAL (MOD) 84211 (typically 30 minutes face-to-face)  [] EVAL (HIGH) 99720 (typically 45 minutes face-to-face)  [] RE-EVAL     [x] CS(35874) x   2  [] IONTO  [] NMR (25024) x     [] VASO  [x] Manual (63616) x  1   [] Other:  [] TA x      [] Mech Traction (95751)  [] ES(attended) (36974)      [] ES (un) (72303):    ASSESSMENT:  Pt and PT altered ex some this visit to alleviate LBP. Pt would benefit from progressive core ex in addition to hip and knee strengthening. GOALS:     Patient stated goal: ? pain     Therapist goals for Patient:   Short Term Goals: To be achieved in: 2 weeks  1. Independent in HEP and progression per patient tolerance, in order to prevent re-injury. [x] Progressing: [] Met: [] Not Met: [] Adjusted      2. Patient will have a decrease in pain to facilitate improvement in movement, function, and ADLs as indicated by Functional Deficits. [x] Progressing: [] Met: [] Not Met: [] Adjusted      Long Term Goals: To be achieved in: 6-8 weeks  1. FOTO >/=  69/100 to assist with reaching prior level of function. [x] Progressing: [] Met: [] Not Met: [] Adjusted      2. Patient will demonstrate increased AROM to WNL to allow for proper joint functioning as indicated by patients Functional Deficits. [x] Progressing: [] Met: [] Not Met: [] Adjusted      3.  Patient will demonstrate an increase in Strength to good proximal hip strength and control, to 5/5 in LE to allow for proper functional mobility as indicated by patients Functional Deficits. [x] Progressing: [] Met: [] Not Met: [] Adjusted      4. Patient will return to all functional activities without increased symptoms or restriction. [x] Progressing: [] Met: [] Not Met: [] Adjusted      5. Pt will (patient specific functional goal)    [x] Progressing: [] Met: [] Not Met: [] Adjusted          Access Code: Norma Shoulders  URL: [email or web address]  Date: 10/05/2022  Prepared by: Alfredo Vee    Exercises  Supine Active Straight Leg Raise - 1 x daily - 7 x weekly - 3 sets - 10 reps  Sidelying Hip Abduction - 1 x daily - 7 x weekly - 3 sets - 10 reps  Clamshell - 1 x daily - 7 x weekly - 3 sets - 10 reps  Seated Hamstring Stretch - 1 x daily - 7 x weekly - 3 sets - 10 reps  Long Sitting Calf Stretch with Strap - 1 x daily - 7 x weekly - 3 sets - 10 reps  Gastroc Stretch on Wall - 1 x daily - 7 x weekly - 3 sets - 10 reps      Overall Progression Towards Functional goals/ Treatment Progress Update:  [] Patient is progressing as expected towards functional goals listed. [] Progression is slowed due to complexities/Impairments listed. [] Progression has been slowed due to co-morbidities.   [x] Plan just implemented, too soon to assess goals progression <30days   [] Goals require adjustment due to lack of progress  [] Patient is not progressing as expected and requires additional follow up with physician  [] Other    Prognosis for POC: [x] Good [] Fair  [] Poor      Patient requires continued skilled intervention: [x] Yes  [] No    Treatment/Activity Tolerance:  [x] Patient able to complete treatment  [] Patient limited by fatigue  [] Patient limited by pain    [] Patient limited by other medical complications  [] Other:                          PLAN: See eval  [x] Continue per plan of care [] Alter current plan (see comments above)  [] Plan of care initiated [] Hold pending MD visit [] Discharge    Electronically signed by:  Gerardo Bradley PT    Note: If patient does not return for scheduled/ recommended follow up visits, this note will serve as a discharge from care along with most recent update on progress.

## 2022-11-16 ENCOUNTER — OFFICE VISIT (OUTPATIENT)
Dept: ORTHOPEDIC SURGERY | Age: 47
End: 2022-11-16
Payer: COMMERCIAL

## 2022-11-16 ENCOUNTER — HOSPITAL ENCOUNTER (OUTPATIENT)
Dept: PHYSICAL THERAPY | Age: 47
Setting detail: THERAPIES SERIES
Discharge: HOME | End: 2022-11-16
Payer: COMMERCIAL

## 2022-11-16 DIAGNOSIS — M17.0 OSTEOARTHRITIS OF PATELLOFEMORAL JOINTS OF BOTH KNEES: Primary | ICD-10-CM

## 2022-11-16 PROCEDURE — 97110 THERAPEUTIC EXERCISES: CPT

## 2022-11-16 PROCEDURE — 99213 OFFICE O/P EST LOW 20 MIN: CPT | Performed by: PHYSICIAN ASSISTANT

## 2022-11-16 NOTE — FLOWSHEET NOTE
The [facility]      Physical Therapy Treatment Note/ Progress Report:     Date:  2022    Patient Name:  Artem Amato    :  1975  MRN: [id number]  Restrictions/Precautions:    Medical/Treatment Diagnosis Information:  Diagnosis: M25.561 R knee pain, M25.562 L knee pain  Treatment Diagnosis: M25.561 R knee pain, M25.562 L knee pain  Insurance/Certification information:   [facility]  Physician Information:   Dr. Nguyễn Berman  Has the plan of care been signed (Y/N):        []  Yes  [x]  No     Date of Patient follow up with Physician: not scheduled yet, recommended in 1 month    Is this a Progress Report:     []  Yes  [x]  No      If Yes:  Date Range for reporting period:  Beginning:  ------------ Ending:     Progress report will be due (10 Rx or 30 days whichever is less):        Recertification will be due (POC Duration  / 90 days whichever is less): 12/3/22        Visit # Insurance Allowable Auth Required   In Person 7 6 visits (10/24 - ) []  Yes     []  No    Tele Health   []  Yes     []  No    Total 7       Functional Scale: FOTO: 59/100    Date assessed:  10/3      Latex Allergy:  []NO      [x]YES  Preferred Language for Healthcare:   [x]English       []other:    Pain level:  1/10     SUBJECTIVE:  Pt reports his knee feels pretty good. OBJECTIVE: See eval  Observation:   Test measurements:      RESTRICTIONS/PRECAUTIONS: none    Exercises/Interventions:   Therapeutic Ex (99926) Sets/sec Reps Notes/CUES HEP   SLR + PPT seated 3 10 Bilat,  changed to sitting x   SL hip ABD 3 10 bilat x   SL clamshell 3 10 Bilat, blueTB x   HS S seated in chair  Gastroc S 30\"  30\" 2  2 Bilat  slant X  x   D/C, ? LBP x   TKE 5\" 2x10 Bilat, CC level 4, added 10/10 x   SLS 30\" 3 Bilat on foam, added 10/17    D/C, ?  LBP    DKTC 10\" 5                         Manual Intervention (60196)        bilat    STM R sided lumbar paraspinals x8' NMR re-education (25702)   CUES NEEDED                                                                   Therapeutic Activity (33095)                                          [employer] access code:   [email or web address]           Therapeutic Exercise and NMR EXR  [x] (30466) Provided verbal/tactile cueing for activities related to strengthening, flexibility, endurance, ROM for improvements in LE, proximal hip, and core control with self care, mobility, lifting, ambulation. [x] (62696) Provided verbal/tactile cueing for activities related to improving balance, coordination, kinesthetic sense, posture, motor skill, proprioception to assist with LE, proximal hip, and core control in self-care, mobility, lifting, ambulation and eccentric single leg control.      NMR and Therapeutic Activities:    [x] (95986 or 22691) Provided verbal/tactile cueing for activities related to improving balance, coordination, kinesthetic sense, posture, motor skill, proprioception and motor activation to allow for proper function of core, proximal hip and LE with self-care and ADLs and functional mobility.   [] (28782) Gait Re-education- Provided training and instruction to the patient for proper LE, core and proximal hip recruitment and positioning and eccentric body weight control with ambulation re-education including up and down stairs     Home Exercise Program:    [x] (12155) Reviewed/Progressed HEP activities related to strengthening, flexibility, endurance, ROM of core, proximal hip and LE for functional self-care, mobility, lifting and ambulation/stair navigation   [] (37790) Reviewed/Progressed HEP activities related to improving balance, coordination, kinesthetic sense, posture, motor skill, proprioception of core, proximal hip and LE for self-care, mobility, lifting, and ambulation/stair navigation      Manual Treatments:  PROM / STM / Oscillations-Mobs:  G-I, II, III, IV (PA's, Inf., Post.)  [x] (09518) Provided manual therapy to mobilize LE, proximal hip and/or LS spine soft tissue/joints for the purpose of modulating pain, promoting relaxation, increasing ROM, reducing/eliminating soft tissue swelling/inflammation/restriction, improving soft tissue extensibility and allowing for proper ROM for normal function with self-care, mobility, lifting and ambulation. Modalities:  declined ice   [] GAME READY (VASO)- for significant edema, swelling, pain control. Charges:  Timed Code Treatment Minutes: 40   Total Treatment Minutes:  40      [] EVAL (LOW) 90235 (typically 20 minutes face-to-face)  [] EVAL (MOD) 30994 (typically 30 minutes face-to-face)  [] EVAL (HIGH) 15129 (typically 45 minutes face-to-face)  [] RE-EVAL     [x] AB(41871) x   2  [] IONTO  [] NMR (46026) x     [] VASO  [] Manual (22944) x  1   [] Other:  [] TA x      [] Mech Traction (55269)  [] ES(attended) (24728)      [] ES (un) (97508):    ASSESSMENT:  Pt and PT altered ex some this visit to alleviate LBP. Pt would benefit from progressive core ex in addition to hip and knee strengthening. GOALS:     Patient stated goal: ? pain     Therapist goals for Patient:   Short Term Goals: To be achieved in: 2 weeks  1. Independent in HEP and progression per patient tolerance, in order to prevent re-injury. [x] Progressing: [] Met: [] Not Met: [] Adjusted      2. Patient will have a decrease in pain to facilitate improvement in movement, function, and ADLs as indicated by Functional Deficits. [x] Progressing: [] Met: [] Not Met: [] Adjusted      Long Term Goals: To be achieved in: 6-8 weeks  1. FOTO >/=  69/100 to assist with reaching prior level of function. [x] Progressing: [] Met: [] Not Met: [] Adjusted      2. Patient will demonstrate increased AROM to WNL to allow for proper joint functioning as indicated by patients Functional Deficits. [x] Progressing: [] Met: [] Not Met: [] Adjusted      3.  Patient will demonstrate an increase in Strength to good proximal hip strength and control, to 5/5 in LE to allow for proper functional mobility as indicated by patients Functional Deficits. [x] Progressing: [] Met: [] Not Met: [] Adjusted      4. Patient will return to all functional activities without increased symptoms or restriction. [x] Progressing: [] Met: [] Not Met: [] Adjusted      5. Pt will (patient specific functional goal)    [x] Progressing: [] Met: [] Not Met: [] Adjusted          Access Code: Migdalia Starks  URL: [email or web address]  Date: 10/05/2022  Prepared by: Jacky Smalls    Exercises  Supine Active Straight Leg Raise - 1 x daily - 7 x weekly - 3 sets - 10 reps  Sidelying Hip Abduction - 1 x daily - 7 x weekly - 3 sets - 10 reps  Clamshell - 1 x daily - 7 x weekly - 3 sets - 10 reps  Seated Hamstring Stretch - 1 x daily - 7 x weekly - 3 sets - 10 reps  Long Sitting Calf Stretch with Strap - 1 x daily - 7 x weekly - 3 sets - 10 reps  Gastroc Stretch on Wall - 1 x daily - 7 x weekly - 3 sets - 10 reps      Overall Progression Towards Functional goals/ Treatment Progress Update:  [x] Patient is progressing as expected towards functional goals listed. [] Progression is slowed due to complexities/Impairments listed. [] Progression has been slowed due to co-morbidities.   [] Plan just implemented, too soon to assess goals progression <30days   [] Goals require adjustment due to lack of progress  [] Patient is not progressing as expected and requires additional follow up with physician  [] Other    Prognosis for POC: [x] Good [] Fair  [] Poor      Patient requires continued skilled intervention: [x] Yes  [] No    Treatment/Activity Tolerance:  [x] Patient able to complete treatment  [] Patient limited by fatigue  [] Patient limited by pain    [] Patient limited by other medical complications  [] Other:                          PLAN: See eval  [x] Continue per plan of care [] Alter current plan (see comments above)  [] Plan of care initiated [] Hold pending MD visit [] Discharge    Electronically signed by:  Daniela Gonzalez PT    Note: If patient does not return for scheduled/ recommended follow up visits, this note will serve as a discharge from care along with most recent update on progress.

## 2022-11-16 NOTE — PROGRESS NOTES
Chief Complaint  Knee Pain (Rt Knee)      History of Present Illness:  Armando Suarez is a pleasant 41 y.o. male here for follow-up regarding his bilateral knees. Patient has been diagnosed with patellofemoral osteoarthritis. He was referred to formal supervised physical therapy. Today patient states that he feels significantly better than he did previously. His pain is primarily anterior going up and down stairs but this has subsided. He continues to improve overall with physical therapy. Medical History:  Patient's medications, allergies, past medical, surgical, social and family histories were reviewed and updated as appropriate. ROS: Review of systems reviewed from Patient History Form completed today and available in the patient's chart under the Media tab. Pertinent items are noted in HPI  Review of systems reviewed from Patient History Form completed today and available in the patient's chart under the Media tab. Vital Signs: There were no vitals taken for this visit. Right knee examination:    Gait: No use of assistive devices. No antalgic gait. Alignment: Alignment appreciated. Inspection/skin: Quadriceps well developed. Skin is intact without erythema or ecchymosis. No gross deformity. Palpation: Crepitus. Nontender along joint line. No pain with compression of patella. Nontender to light touch. Range of Motion: Full ROM. Strength: 5/5 quad strength    Effusion: No apparent effusion. Ligamentous stability: Stable to valgus and varus stress at 0° and 30°. Solid endpoint with Lachman's. Negative posterior and anterior drawer signs. Patella tracking: Smooth translation of patella. Special tests: Negative Rodolfo sign. Patella apprehension sign negative. Neurologic and vascular: Skin is warm and well-perfused. Distally neurovascularly intact. Additional findings: Calf soft nontender. Sensation is intact to light-touch.  No pretibial edema. Left knee examination:    Gait: No use of assistive devices. No antalgic gait. Alignment: Alignment appreciated. Inspection/skin: Quadriceps well developed. Skin is intact without erythema or ecchymosis. No gross deformity. Palpation: Crepitus. Nontender along joint line. No pain with compression of patella. Nontender to light touch. Range of Motion: Full ROM. Strength: 5/5 quad strength    Effusion: No apparent effusion. Ligamentous stability: Stable to valgus and varus stress at 0° and 30°. Solid endpoint with Lachman's. Negative posterior and anterior drawer signs. Patella tracking: Smooth translation of patella. Special tests: Negative Rodolfo sign. Patella apprehension sign negative. Neurologic and vascular: Skin is warm and well-perfused. Distally neurovascularly intact. Additional findings: Calf soft nontender. Sensation is intact to light-touch. No pretibial edema. Radiology:       Pertinent imaging was interpreted and reviewed with the patient today, images only - no report available. No new imaging was obtained during today's visit. Assessment : Bilateral knee patellofemoral osteoarthritis    Impression:  Encounter Diagnosis   Name Primary? Osteoarthritis of patellofemoral joints of both knees Yes       Office Procedures:  No orders of the defined types were placed in this encounter. Plan: Pertinent imaging was reviewed. The etiology, natural history, and treatment options for the disorder were discussed. The roles of activity medication, antiinflammatories, injections, bracing, physical therapy, and surgical interventions were all described to the patient and questions were answered. Patient has responded quite well to formal supervised physical therapy. I would like him to continue working with PT advancing his activity as tolerated.   He may discontinue or cut back on the amount of times he attends however I would like him to continue working on his own. Saleem Chavarria is in agreement with this plan. All questions were answered to patient's satisfaction and was encouraged to call with any further questions. Total time spent for evaluation, education, and development of treatment plan: 25 minutes    Bismark Lynch, [address] [place], [place] [address]angy  11/16/2022      This dictation was performed with a verbal recognition program (DRAGON) and it was checked for errors. It is possible that there are still dictated errors within this office note. If so, please bring any areas to my attention for an addendum. All efforts were made to ensure that this office note is accurate.